# Patient Record
Sex: FEMALE | Race: BLACK OR AFRICAN AMERICAN | URBAN - METROPOLITAN AREA
[De-identification: names, ages, dates, MRNs, and addresses within clinical notes are randomized per-mention and may not be internally consistent; named-entity substitution may affect disease eponyms.]

---

## 2017-09-20 ENCOUNTER — HISTORICAL (OUTPATIENT)
Dept: LAB | Facility: HOSPITAL | Age: 22
End: 2017-09-20

## 2017-09-20 LAB
ABS NEUT (OLG): 8.3 X10(3)/MCL (ref 1.5–6.9)
ALBUMIN SERPL-MCNC: 4.5 GM/DL (ref 3.4–5)
ALBUMIN/GLOB SERPL: 1.2 RATIO
ALP SERPL-CCNC: 66 UNIT/L (ref 30–113)
ALT SERPL-CCNC: 16 UNIT/L (ref 10–45)
APPEARANCE, UA: CLEAR
AST SERPL-CCNC: 9 UNIT/L (ref 15–37)
BILIRUB SERPL-MCNC: 0.8 MG/DL (ref 0.1–0.9)
BILIRUB UR QL STRIP: NEGATIVE
BILIRUBIN DIRECT+TOT PNL SERPL-MCNC: 0.1 MG/DL (ref 0–0.3)
BILIRUBIN DIRECT+TOT PNL SERPL-MCNC: 0.7 MG/DL
BUN SERPL-MCNC: 10 MG/DL (ref 10–20)
CALCIUM SERPL-MCNC: 9.6 MG/DL (ref 8–10.5)
CHLORIDE SERPL-SCNC: 102 MMOL/L (ref 100–108)
CO2 SERPL-SCNC: 26 MMOL/L (ref 21–35)
COLOR UR: NORMAL
CREAT SERPL-MCNC: 0.62 MG/DL (ref 0.7–1.3)
CRP SERPL-MCNC: <0.2 MG/DL (ref 0–0.9)
DEPRECATED CALCIDIOL+CALCIFEROL SERPL-MC: 11.21 NG/ML (ref 30–80)
ERYTHROCYTE [DISTWIDTH] IN BLOOD BY AUTOMATED COUNT: 11.5 % (ref 11.5–17)
ERYTHROCYTE [SEDIMENTATION RATE] IN BLOOD: 5 MM/HR (ref 0–20)
FOLATE SERPL-MCNC: 10.4 NG/ML (ref 8.6–58.9)
GLOBULIN SER-MCNC: 3.8 GM/DL
GLUCOSE (UA): NEGATIVE
GLUCOSE SERPL-MCNC: 93 MG/DL (ref 75–116)
HCT VFR BLD AUTO: 40.5 % (ref 36–48)
HGB BLD-MCNC: 14.2 GM/DL (ref 12–16)
HGB UR QL STRIP: NEGATIVE
KETONES UR QL STRIP: NEGATIVE
LEUKOCYTE ESTERASE UR QL STRIP: NEGATIVE
MCH RBC QN AUTO: 30 PG (ref 27–34)
MCHC RBC AUTO-ENTMCNC: 35 GM/DL (ref 31–36)
MCV RBC AUTO: 85 FL (ref 80–99)
NITRITE UR QL STRIP: NEGATIVE
PH UR STRIP: 6.5 [PH]
PLATELET # BLD AUTO: 341 X10(3)/MCL (ref 140–400)
PMV BLD AUTO: 10.8 FL (ref 6.8–10)
POTASSIUM SERPL-SCNC: 4.3 MMOL/L (ref 3.6–5.2)
PROT SERPL-MCNC: 8.3 GM/DL (ref 6.4–8.2)
PROT UR QL STRIP: NEGATIVE
RBC # BLD AUTO: 4.76 X10(6)/MCL (ref 4.2–5.4)
SODIUM SERPL-SCNC: 137 MMOL/L (ref 135–145)
SP GR UR STRIP: 1.01
TSH SERPL-ACNC: 2.64 MIU/ML (ref 0.36–3.74)
UROBILINOGEN UR STRIP-ACNC: 0.2 EU/DL
VIT B12 SERPL-MCNC: 522 PG/ML (ref 193–986)
WBC # SPEC AUTO: 13.1 X10(3)/MCL (ref 4.5–11.5)

## 2022-04-26 ENCOUNTER — HISTORICAL (OUTPATIENT)
Dept: LAB | Facility: HOSPITAL | Age: 27
End: 2022-04-26

## 2022-04-26 LAB
ABS NEUT (OLG): 3.3 (ref 1.5–6.9)
ALBUMIN SERPL-MCNC: 4.8 G/DL (ref 3.5–5)
ALBUMIN/GLOB SERPL: 1.5 {RATIO} (ref 1.1–2)
ALP SERPL-CCNC: 56 U/L (ref 40–150)
ALT SERPL-CCNC: 11 U/L (ref 0–55)
AST SERPL-CCNC: 17 U/L (ref 5–34)
BASOPHILS # BLD AUTO: 0 10*3/UL (ref 0–0.1)
BASOPHILS NFR BLD AUTO: 0 % (ref 0–1)
BILIRUB SERPL-MCNC: 0.7 MG/DL
BILIRUBIN DIRECT+TOT PNL SERPL-MCNC: 0.3 (ref 0–0.5)
BILIRUBIN DIRECT+TOT PNL SERPL-MCNC: 0.4 (ref 0–0.8)
BUN SERPL-MCNC: 8 MG/DL (ref 7–18.7)
CALCIUM SERPL-MCNC: 10.2 MG/DL (ref 8.7–10.5)
CHLORIDE SERPL-SCNC: 105 MMOL/L (ref 98–107)
CHOLEST SERPL-MCNC: 183 MG/DL
CHOLEST/HDLC SERPL: 4 {RATIO} (ref 0–5)
CO2 SERPL-SCNC: 28 MMOL/L (ref 22–29)
CREAT SERPL-MCNC: 0.65 MG/DL (ref 0.55–1.02)
EOSINOPHIL # BLD AUTO: 0.2 10*3/UL (ref 0–0.6)
EOSINOPHIL NFR BLD AUTO: 3 % (ref 0–5)
ERYTHROCYTE [DISTWIDTH] IN BLOOD BY AUTOMATED COUNT: 10.8 % (ref 11.5–17)
GLOBULIN SER-MCNC: 3.2 G/DL (ref 2.4–3.5)
GLUCOSE P FAST SERPL-MCNC: 78 MG/DL (ref 70–115)
GLUCOSE SERPL-MCNC: 78 MG/DL (ref 74–100)
HCT VFR BLD AUTO: 39.8 % (ref 36–48)
HDLC SERPL-MCNC: 42 MG/DL (ref 35–60)
HEMOLYSIS INTERF INDEX SERPL-ACNC: 2
HGB BLD-MCNC: 13.4 G/DL (ref 12–16)
ICTERIC INTERF INDEX SERPL-ACNC: 1
IMM GRANULOCYTES # BLD AUTO: 0.02 10*3/UL (ref 0–0.02)
IMM GRANULOCYTES NFR BLD AUTO: 0.3 % (ref 0–0.43)
LDLC SERPL CALC-MCNC: 130 MG/DL (ref 50–140)
LIPEMIC INTERF INDEX SERPL-ACNC: 4
LYMPHOCYTES # BLD AUTO: 2.6 10*3/UL (ref 0.5–4.1)
LYMPHOCYTES NFR BLD AUTO: 39 % (ref 15–40)
MANUAL DIFF? (OHS): NO
MCH RBC QN AUTO: 29 PG (ref 27–34)
MCHC RBC AUTO-ENTMCNC: 34 G/DL (ref 31–36)
MCV RBC AUTO: 87 FL (ref 80–99)
MONOCYTES # BLD AUTO: 0.5 10*3/UL (ref 0–1.1)
MONOCYTES NFR BLD AUTO: 8 % (ref 4–12)
NEUTROPHILS # BLD AUTO: 3.3 10*3/UL (ref 1.5–6.9)
NEUTROPHILS NFR BLD AUTO: 50 % (ref 43–75)
PLATELET # BLD AUTO: 281 10*3/UL (ref 140–400)
PMV BLD AUTO: 10.3 FL (ref 6.8–10)
POTASSIUM SERPL-SCNC: 3.8 MMOL/L (ref 3.5–5.1)
PROT SERPL-MCNC: 8 G/DL (ref 6.4–8.3)
RBC # BLD AUTO: 4.57 10*6/UL (ref 4.2–5.4)
SODIUM SERPL-SCNC: 139 MMOL/L (ref 136–145)
T4 FREE SERPL-MCNC: 0.88 NG/DL (ref 0.7–1.48)
TRIGL SERPL-MCNC: 56 MG/DL (ref 37–140)
TSH SERPL-ACNC: 0.98 M[IU]/L (ref 0.35–4.94)
VLDLC SERPL CALC-MCNC: 11 MG/DL
WBC # SPEC AUTO: 6.6 10*3/UL (ref 4.5–11.5)

## 2024-09-18 DIAGNOSIS — R87.810 ASCUS WITH POSITIVE HIGH RISK HPV CERVICAL: Primary | ICD-10-CM

## 2024-09-18 DIAGNOSIS — R87.610 ASCUS WITH POSITIVE HIGH RISK HPV CERVICAL: Primary | ICD-10-CM

## 2024-11-22 ENCOUNTER — PROCEDURE VISIT (OUTPATIENT)
Dept: GYNECOLOGY | Facility: CLINIC | Age: 29
End: 2024-11-22
Payer: MEDICAID

## 2024-11-22 VITALS
HEIGHT: 63 IN | HEART RATE: 98 BPM | BODY MASS INDEX: 25.8 KG/M2 | OXYGEN SATURATION: 100 % | TEMPERATURE: 99 F | SYSTOLIC BLOOD PRESSURE: 121 MMHG | WEIGHT: 145.63 LBS | DIASTOLIC BLOOD PRESSURE: 82 MMHG

## 2024-11-22 DIAGNOSIS — R87.810 ASCUS WITH POSITIVE HIGH RISK HPV CERVICAL: Primary | ICD-10-CM

## 2024-11-22 DIAGNOSIS — R87.610 ASCUS WITH POSITIVE HIGH RISK HPV CERVICAL: Primary | ICD-10-CM

## 2024-11-22 RX ORDER — MULTIVITAMIN
1 TABLET ORAL DAILY
COMMUNITY

## 2024-11-22 NOTE — PROCEDURES
Colposcopy    Date/Time: 11/22/2024 10:00 AM    Performed by: Spring Salazar MD  Authorized by: Tomi Barber MD    Consent obatined:  Prior to procedure the appropriate consent was completed and verified  Timeout:Immediately prior to procedure a time out was called to verify the correct patient, procedure, equipment, support staff and site/side marked as required    Colposcopy Site:  Cervix  Position:  Supine  Acrowhite Lesion: No    Transformation Zone Adequate?: Yes    Biopsy?: No    ECC Performed?: Yes    Estimated blood loss (cc):  2   Patient tolerated the procedure well with no immediate complications.   Post-operative instructions were provided for the patient.   Patient was discharged and will follow up if any complications occur     COLPOSCOPY EXAM:     Timeout performed     The vulva and vagina were grossly inspected and found to be normal.    An endocervical polyp and ectropion noted on cervix. No aceto white lesions visualized. Lugol's solution was applied with no suspicious areas visualized.    Squamocolumnar junction visualized: Yes.  ECC was performed.    The speculum was removed.  The patient did tolerate the procedure well.    All collected specimens sent to pathology for histologic analysis.    Colposcopic impression: benign.    Post-colposcopy counseling:  The patient was instructed to manage post-colposcopy cramping with NSAIDs or Tylenol, or with a prescription per the medication card. Spotting after procedure is normal. Avoid intercourse, douching, or tampons in the vagina for at least 2-3 days.  Report heavy bleeding, worsening pain or pain that does not respond to above medications, or foul-smelling vaginal discharge. HPV vaccine recommended according to FDA age guidelines.  Importance of follow-up stressed.      Follow up based on colposcopy results.                            [facility] COLPOSCOPY CLINIC NOTE    Cari RIGGINS Cynthia is a 29 y.o. G0 referred to [facility] colposcopy clinic from  "[facility].    Results of abnormal pap smear were discussed with patient. Indications/risks/benefits of colposcopy were discussed and consents were signed and witnessed prior to the procedure, all questions answered.    Pap/Colpo History:  Pap 2024 - ASCUS, untyped HR HPV +  Pap 2021 - NIL    Sexual History:  Number of sex partners: Current 0; Lifetime 4  Contraception: Depo-provera intermittently since 2016  Future fertility desires: yes  Smoking History: Denies  STD History: Hx of trichomonas in  (treated)  HIV status: Negative  HPV vaccination status: Completed Gardasil series (2007, 2007, 2008)    GYN History:  Last menstrual period: Secondary amenorrhea due to Depo-provera      OB History:   OB History    No obstetric history on file.       Past Medical History:   Diagnosis Date    Abnormal Pap smear of cervix      History reviewed. No pertinent surgical history.  Social History     Tobacco Use    Smoking status: Never    Smokeless tobacco: Never   Substance Use Topics    Alcohol use: Yes     Comment: occ    Drug use: Not Currently         Objective:     /82 (BP Location: Right arm)   Pulse 98   Temp 98.6 °F (37 °C)   Ht 5' 3" (1.6 m)   Wt 66 kg (145 lb 9.6 oz)   SpO2 100%   BMI 25.79 kg/m²   Physical Exam:  Gen: Well-nourished, well-developed female appearing stated age. Alert, cooperative, in no acute distress.    Urine Pregnancy Test: Negative    SEE COLPOSCOPY PROCEDURE NOTE    Assessment:       29 y.o. G0 here for:  1. ASCUS with positive high risk HPV cervical  Ambulatory referral/consult to Gynecology    Specimen to Pathology Gynecology and Obstetrics           COLPOSCOPIC IMPRESSION: normal    Plan:     - Colposcopy adequate  - Precautions given to the patient to return to ED if vaginal bleeding, fevers, pain, or any other concerns. Patient expressed understanding and all questions were answered.  - Will contact patient with results and determine further follow up at " that time.    Problem List Items Addressed This Visit    None  Visit Diagnoses       ASCUS with positive high risk HPV cervical        Relevant Orders    Specimen to Pathology Gynecology and Obstetrics            Discussed with Dr. Barber who was present for the entire procedure.        Spring Salazar MD  [facility] Family Medicine Resident, [facility] [place]

## 2024-12-08 ENCOUNTER — TELEPHONE (OUTPATIENT)
Dept: GYNECOLOGY | Facility: CLINIC | Age: 29
End: 2024-12-08
Payer: MEDICAID

## 2024-12-08 NOTE — TELEPHONE ENCOUNTER
Called patient to discuss results of recent colposcopy office visit. No answer: will try again later        Based on patient pap smear results and recent colposcopy findings/results:    Pap:     6/21 NIL  9/2024 ascus HR HPV +    Colposcopy results:  Endocervix, curettage:   - Fragments of benign endocervical mucosa.     Plan:   Repeat pap in 1 year with co-testing, OK to continue with referring provider.    Discussed with Dr.Nguyen Erendira Hidalgo MD   PGY2, Obstetrics & Gynecology   [facility]

## 2024-12-12 ENCOUNTER — TELEPHONE (OUTPATIENT)
Dept: GYNECOLOGY | Facility: CLINIC | Age: 29
End: 2024-12-12
Payer: MEDICAID

## 2024-12-12 NOTE — TELEPHONE ENCOUNTER
Patient called to inquire about her colposcopy results. Informed patient of the below results per Dr. Hidalgo. Patient verbalized understanding and is scheduled to see the health unit in 11/2025 for her annual. I stressed the importance of attending that appt. Patient verbalized understanding.     Based on patient pap smear results and recent colposcopy findings/results:     Pap:     6/21 NIL  9/2024 ascus HR HPV +     Colposcopy results:  Endocervix, curettage:   - Fragments of benign endocervical mucosa.      Plan:   Repeat pap in 1 year with co-testing, OK to continue with referring provider.     Discussed with Dr.Nguyen Erendira Hidalgo MD   PGY2, Obstetrics & Gynecology   [facility]

## 2024-12-12 NOTE — TELEPHONE ENCOUNTER
Good afternoon,     Clinical staff, please fax colposcopy visit note & pathology results/recommendations to patients referring provider (health unit). Thank you!

## 2024-12-22 ENCOUNTER — TELEPHONE (OUTPATIENT)
Dept: GYNECOLOGY | Facility: CLINIC | Age: 29
End: 2024-12-22
Payer: MEDICAID

## 2024-12-22 NOTE — TELEPHONE ENCOUNTER
MD made HIPAA compliant phone call X2 to discuss colpo results. NO answer.     Erendira Hidalgo MD   PGY2, Obstetrics & Gynecology   [facility]